# Patient Record
Sex: FEMALE | Race: WHITE | NOT HISPANIC OR LATINO | URBAN - METROPOLITAN AREA
[De-identification: names, ages, dates, MRNs, and addresses within clinical notes are randomized per-mention and may not be internally consistent; named-entity substitution may affect disease eponyms.]

---

## 2017-04-13 ENCOUNTER — IMPORTED ENCOUNTER (OUTPATIENT)
Dept: URBAN - METROPOLITAN AREA CLINIC 1 | Facility: CLINIC | Age: 37
End: 2017-04-13

## 2017-04-13 PROBLEM — H52.223: Noted: 2017-04-13

## 2017-04-13 PROBLEM — H52.13: Noted: 2017-04-13

## 2017-04-13 PROCEDURE — S0621 ROUTINE OPHTHALMOLOGICAL EXA: HCPCS

## 2017-04-13 NOTE — PATIENT DISCUSSION
1. Myopia: Rx was given for correction if indicated and requested. 2. Astigmatism OU3. Dry Eyes OU - Recommend ATs BID OU Return for an appointment in 1 week cc with Dr. Maricarmen Butterfield.

## 2017-04-19 ENCOUNTER — IMPORTED ENCOUNTER (OUTPATIENT)
Dept: URBAN - METROPOLITAN AREA CLINIC 1 | Facility: CLINIC | Age: 37
End: 2017-04-19

## 2017-04-28 ENCOUNTER — IMPORTED ENCOUNTER (OUTPATIENT)
Dept: URBAN - METROPOLITAN AREA CLINIC 1 | Facility: CLINIC | Age: 37
End: 2017-04-28

## 2018-04-13 ENCOUNTER — IMPORTED ENCOUNTER (OUTPATIENT)
Dept: URBAN - METROPOLITAN AREA CLINIC 1 | Facility: CLINIC | Age: 38
End: 2018-04-13

## 2018-04-13 PROBLEM — H52.13: Noted: 2018-04-13

## 2018-04-13 PROCEDURE — S0621 ROUTINE OPHTHALMOLOGICAL EXA: HCPCS

## 2018-04-13 NOTE — PATIENT DISCUSSION
1. Myopia: Rx was given for correction if indicated and requested. 2. Dry Eyes OU Return for an appointment in 1 week cc after trial  with Dr. Justine Quick for an appointment in 1 year 40/cc with Dr. Burt Telles.

## 2021-05-20 NOTE — PATIENT DISCUSSION
MYOPIA (progressive high) OU: NO HOLES OR TEARS SEEN 360' OU. FLOATERS AND FLASHES PRECAUTIONS  REVIEWED WITH PT. PRESCRIBED GLASSES AND OR CONTACTS. REFER TO DR. Cathi Ray FOR EVALUATION &amp; CATARACT SX CLEARANCE.

## 2021-06-10 NOTE — PATIENT DISCUSSION
New Prescription: prednisol ace-gatiflox-bromfen (prednisol ace-gatiflox-bromfen): drops,suspension: 1-0.5-0.075% 1 drop three times a day as directed [facility]

## 2021-06-10 NOTE — PATIENT DISCUSSION
SYNERGY WAS DISCUSSED WITH THE PATIENT. THE PATIENT UNDERSTANDS THAT THE COMPROMISES AND VISUAL SYMPTOMS FROM PATIENT TO PATIENT IS VARIABLE. IT IS IMPOSSIBLE TO PREDICT THE OUTCOME OF THE LENS BEING OFFERED. SYNERGY IS TRIFOCAL IOL IS DESIGNED TO DECREASE DEPENDENCY ON GLASSES FOR NEAR, INTERMEDIATE AND DISTANCE. COMPROMISES INCLUDE DECREASED QUALITY OF VISION, DEPTH OF FOCUS AND CONTRAST SENSITIVITY. VISUAL DISTURBANCES INCLUDE STARBURSTS, RINGS AND HALOS USUALLY NOTICED AROUND POINT SOURCES OF LIGHT AT NIGHT.

## 2021-06-10 NOTE — PATIENT DISCUSSION
CATARACT, OU - VISUALLY SIGNIFICANT OD &gt; OS. SCHEDULE SX OD THEN LATER IN OS IF VISUAL SYMPTOMS PERSIST, DISCUSSED LIKELY ANISOMETROPIA WILL PRESENT IF DOES NOT PROCEED WITH SURGERY OS AND FEEL IMBALANCED OR DOES NOT HAVE THE CONTACT LENSES IN THE OS

## 2021-06-24 NOTE — PATIENT DISCUSSION
Continue: prednisol ace-gatiflox-bromfen (prednisol ace-gatiflox-bromfen): drops,suspension: 1-0.5-0.075% 1 drop three times a day [facility]

## 2021-07-01 NOTE — PATIENT DISCUSSION
1 DAY S/P PC IOL, OS  - CONTINUE DROPS AS DIRECTED. PT RELEASED TO  PROVIDER FOR CONTINUED CARE. RTC PRN.

## 2021-07-01 NOTE — PATIENT DISCUSSION
Continue: prednisol ace-gatiflox-bromfen (prednisol ace-gatiflox-bromfen): drops,suspension: 1-0.5-0.075% 1 drop three times a day as directed [facility]

## 2021-09-23 ENCOUNTER — IMPORTED ENCOUNTER (OUTPATIENT)
Dept: URBAN - METROPOLITAN AREA CLINIC 1 | Facility: CLINIC | Age: 41
End: 2021-09-23

## 2021-09-23 PROBLEM — H52.13: Noted: 2021-09-23

## 2021-09-23 PROBLEM — H52.223: Noted: 2021-09-23

## 2021-09-23 PROCEDURE — S0620 ROUTINE OPHTHALMOLOGICAL EXA: HCPCS

## 2021-09-23 NOTE — PATIENT DISCUSSION
1. Myopia with Astigmatism OU -- Rx was given for correction if indicated and requested. 2. Dry Eyes w/PEK OU -- Recommend ATs TID OU routinely. 3.  Glaucoma Suspect OU (CD: 0.45/0.55) -- IOP 18 OU. Positive FHx (grandmother). Consider baseline testing next visit. Pt no longer wishes to do CTLs states was not able to wear them more than a few hours. Return for an appointment in 6 month 30/OCT with Dr. Meka De La Cruz.

## 2021-11-22 NOTE — PATIENT DISCUSSION
Posterior capsular opacity accounts for the patient's complaints and is interfering with activities of daily living. Discussed risks and benefits of YAG Laser Capsulotomy. Patient wishes to proceed. Schedule YAG Laser Capsulotomy in the left eye.

## 2021-11-22 NOTE — PATIENT DISCUSSION
PCO and Multifocal Counseling: The patient has a multifocal lens in the setting of PCO.  It was explained to the patient that opening the posterior capsule with a Yag laser can make a subsequent IOL exchange more difficult.  It was emphasized to the patient that if they were not tolerating the vision from the multifocal lens, that an IOL exchange would be considered rather than a Yag laser procedure.  The patient indicates that they are tolerating the multifocal lens and they are not considering an IOL exchange. The patient indicates that they are happy with their multifocal lens and the vision it provides.  The patient indicates that would like to have their vision made better with the understanding that they are planning to keep their multifocal lens indefinitely.

## 2022-03-25 ENCOUNTER — COMPREHENSIVE EXAM (OUTPATIENT)
Dept: URBAN - METROPOLITAN AREA CLINIC 1 | Facility: CLINIC | Age: 42
End: 2022-03-25

## 2022-03-25 DIAGNOSIS — H40.023: ICD-10-CM

## 2022-03-25 DIAGNOSIS — E11.9: ICD-10-CM

## 2022-03-25 PROCEDURE — 92014 COMPRE OPH EXAM EST PT 1/>: CPT

## 2022-03-25 PROCEDURE — 92133 CPTRZD OPH DX IMG PST SGM ON: CPT

## 2022-03-25 ASSESSMENT — TONOMETRY
OD_IOP_MMHG: 17
OS_IOP_MMHG: 16

## 2022-03-25 ASSESSMENT — VISUAL ACUITY
OD_CC: 20/20
OS_CC: 20/25

## 2022-03-25 NOTE — PATIENT DISCUSSION
(CD 0.45/0.60) - OCT shows mild RNFL thinning OU. IOP stable. +Family hx (Grandmother). *Treat with future progression. Patient is considered high risk. Condition was discussed with patient and patient understands. Will continue to monitor patient for any progression in condition. Patient was advised to call us with any problems, questions, or concerns.

## 2022-04-02 ASSESSMENT — VISUAL ACUITY
OS_SC: 20/25
OS_SC: 20/20
OS_CC: J1+
OD_SC: 20/20
OS_SC: 20/20
OD_CC: J1
OD_SC: 20/20
OD_SC: 20/20
OS_CC: J1+
OD_CC: J1+
OS_SC: 20/20
OD_CC: J1+
OS_CC: J1
OD_SC: 20/20

## 2022-04-02 ASSESSMENT — KERATOMETRY
OD_K1POWER_DIOPTERS: 44.50
OD_K2POWER_DIOPTERS: 45.75
OS_AXISANGLE2_DEGREES: 180
OS_K2POWER_DIOPTERS: 45.75
OD_AXISANGLE2_DEGREES: 167
OS_K1POWER_DIOPTERS: 44.50

## 2022-04-02 ASSESSMENT — TONOMETRY
OS_IOP_MMHG: 17
OS_IOP_MMHG: 16
OD_IOP_MMHG: 16
OD_IOP_MMHG: 17
OD_IOP_MMHG: 18
OS_IOP_MMHG: 18

## 2022-06-08 NOTE — PATIENT DISCUSSION
Patient qualified for and was enrolled in the study. Study ID#[id number]. Study involves one visit that was completed today. Today’s exam available in research paper chart.

## 2022-09-26 ENCOUNTER — FOLLOW UP (OUTPATIENT)
Dept: URBAN - METROPOLITAN AREA CLINIC 1 | Facility: CLINIC | Age: 42
End: 2022-09-26

## 2022-09-26 DIAGNOSIS — H40.023: ICD-10-CM

## 2022-09-26 PROCEDURE — 92083 EXTENDED VISUAL FIELD XM: CPT

## 2022-09-26 PROCEDURE — 92012 INTRM OPH EXAM EST PATIENT: CPT

## 2022-09-26 ASSESSMENT — VISUAL ACUITY
OS_CC: 20/20
OD_CC: J1
OD_CC: 20/20
OS_CC: J1

## 2022-09-26 ASSESSMENT — TONOMETRY
OS_IOP_MMHG: 16
OD_IOP_MMHG: 16

## 2022-09-26 NOTE — PATIENT DISCUSSION
(CD 0.45/0.60) - HVF today shows Normal.. IOP stable. +Family hx (Grandmother). Continue at this time without gtts. *Treat with future progression. Patient is considered high risk. Condition was discussed with patient and patient understands. Will continue to monitor patient for any progression in condition. Patient was advised to call us with any problems, questions, or concerns.

## 2023-07-10 ENCOUNTER — COMPREHENSIVE EXAM (OUTPATIENT)
Dept: URBAN - METROPOLITAN AREA CLINIC 1 | Facility: CLINIC | Age: 43
End: 2023-07-10

## 2023-07-10 DIAGNOSIS — E11.9: ICD-10-CM

## 2023-07-10 DIAGNOSIS — H04.123: ICD-10-CM

## 2023-07-10 DIAGNOSIS — H40.023: ICD-10-CM

## 2023-07-10 PROCEDURE — 92014 COMPRE OPH EXAM EST PT 1/>: CPT

## 2023-07-10 PROCEDURE — 92133 CPTRZD OPH DX IMG PST SGM ON: CPT

## 2023-07-10 ASSESSMENT — VISUAL ACUITY
OD_SC: J2
OS_CC: 20/25
OS_SC: J2
OD_CC: 20/25-1

## 2023-07-10 ASSESSMENT — TONOMETRY
OS_IOP_MMHG: 17
OD_IOP_MMHG: 18

## 2025-05-15 ENCOUNTER — COMPREHENSIVE EXAM (OUTPATIENT)
Age: 45
End: 2025-05-15

## 2025-05-15 DIAGNOSIS — H52.4: ICD-10-CM

## 2025-05-15 DIAGNOSIS — H52.223: ICD-10-CM

## 2025-05-15 DIAGNOSIS — H52.13: ICD-10-CM

## 2025-05-15 DIAGNOSIS — Z01.00: ICD-10-CM

## 2025-05-15 PROCEDURE — 92014 COMPRE OPH EXAM EST PT 1/>: CPT

## 2025-05-15 PROCEDURE — 92015 DETERMINE REFRACTIVE STATE: CPT
